# Patient Record
Sex: FEMALE | Race: WHITE | Employment: OTHER | URBAN - METROPOLITAN AREA
[De-identification: names, ages, dates, MRNs, and addresses within clinical notes are randomized per-mention and may not be internally consistent; named-entity substitution may affect disease eponyms.]

---

## 2017-03-20 ENCOUNTER — OFFICE VISIT (OUTPATIENT)
Dept: FAMILY MEDICINE CLINIC | Facility: CLINIC | Age: 52
End: 2017-03-20

## 2017-03-20 VITALS
RESPIRATION RATE: 18 BRPM | OXYGEN SATURATION: 98 % | BODY MASS INDEX: 33.21 KG/M2 | TEMPERATURE: 99 F | SYSTOLIC BLOOD PRESSURE: 128 MMHG | WEIGHT: 206.63 LBS | HEIGHT: 66 IN | HEART RATE: 78 BPM | DIASTOLIC BLOOD PRESSURE: 78 MMHG

## 2017-03-20 DIAGNOSIS — J20.9 BRONCHITIS WITH BRONCHOSPASM: ICD-10-CM

## 2017-03-20 DIAGNOSIS — J01.00 ACUTE NON-RECURRENT MAXILLARY SINUSITIS: Primary | ICD-10-CM

## 2017-03-20 PROCEDURE — 99213 OFFICE O/P EST LOW 20 MIN: CPT | Performed by: NURSE PRACTITIONER

## 2017-03-20 RX ORDER — FLUTICASONE PROPIONATE 50 MCG
2 SPRAY, SUSPENSION (ML) NASAL DAILY
Qty: 1 BOTTLE | Refills: 0 | Status: SHIPPED | OUTPATIENT
Start: 2017-03-20 | End: 2017-04-10

## 2017-03-20 RX ORDER — BENZONATATE 100 MG/1
200 CAPSULE ORAL 3 TIMES DAILY PRN
Qty: 15 CAPSULE | Refills: 0 | Status: SHIPPED | OUTPATIENT
Start: 2017-03-20 | End: 2017-03-25

## 2017-03-20 RX ORDER — DOXYCYCLINE HYCLATE 100 MG/1
100 CAPSULE ORAL 2 TIMES DAILY
Qty: 14 CAPSULE | Refills: 0 | Status: SHIPPED | OUTPATIENT
Start: 2017-03-20 | End: 2017-03-27

## 2017-03-20 NOTE — PROGRESS NOTES
CHIEF COMPLAINT:   Patient presents with:  URI: This is a 45 yo female with known mild intermittant asthma presentin with head nasal congestion sinus pressure post nasal drainage and excerbation of asthma sx      HPI:   Yehuda Krause is a 46year old Use: Yes           0.0 oz/week       0 Standard drinks or equivalent per week       Comment: socially        REVIEW OF SYSTEMS:   GENERAL: feels well otherwise,   decreased appetite Taking fluids well  SKIN: no rashes or abnormal skin lesions  HEENT: See H Refills for this Visit:    Continue asthma rescue inhaler; Advair;Singulair as prescribed  Doxycycline  100 mg twice a day x 10 day  Flonase 2 sprays each nostril at bedtime  If any sob worsening of cough or wheezing to go to [facility]      No prescriptions re

## 2017-03-20 NOTE — PATIENT INSTRUCTIONS
Bronchitis with Wheezing (Viral or Bacterial: Adult)    Bronchitis is an infection of the air passages. It often occurs during a cold and is usually caused by a virus. Symptoms include cough with mucus (phlegm) and low-grade fever.  This illness is contag · Over-the-counter cough, cold, and sore-throat medicines will not shorten the length of the illness, but they may be helpful to reduce symptoms.  (Note: Do not use decongestants if you have high blood pressure.)  · If you were given an inhaler, use it exac The sinuses are air-filled spaces within the bones of the face. They connect to the inside of the nose. Sinusitis is an inflammation of the tissue lining the sinus cavity. Sinus inflammation can occur during a cold.  It can also be due to allergies to polle · Do not use nasal rinses or irrigation during an acute sinus infection, unless told to by your health care provider. Rinsing may spread the infection to other sinuses.   · Use acetaminophen or ibuprofen to control pain, unless another pain medicine was pre

## 2019-04-15 ENCOUNTER — WALK IN (OUTPATIENT)
Dept: URGENT CARE | Age: 54
End: 2019-04-15

## 2019-04-15 VITALS
RESPIRATION RATE: 16 BRPM | TEMPERATURE: 98.9 F | OXYGEN SATURATION: 97 % | DIASTOLIC BLOOD PRESSURE: 80 MMHG | SYSTOLIC BLOOD PRESSURE: 140 MMHG | HEART RATE: 98 BPM | HEIGHT: 66 IN

## 2019-04-15 DIAGNOSIS — J98.01 COUGH DUE TO BRONCHOSPASM: ICD-10-CM

## 2019-04-15 DIAGNOSIS — J02.9 SORE THROAT: ICD-10-CM

## 2019-04-15 DIAGNOSIS — I10 HYPERTENSION, UNSPECIFIED TYPE: ICD-10-CM

## 2019-04-15 DIAGNOSIS — H66.91 RIGHT ACUTE OTITIS MEDIA: Primary | ICD-10-CM

## 2019-04-15 DIAGNOSIS — R09.81 SINUS CONGESTION: ICD-10-CM

## 2019-04-15 LAB
INTERNAL PROCEDURAL CONTROLS ACCEPTABLE: YES
S PYO AG THROAT QL IA.RAPID: NEGATIVE

## 2019-04-15 PROCEDURE — 87880 STREP A ASSAY W/OPTIC: CPT | Performed by: NURSE PRACTITIONER

## 2019-04-15 PROCEDURE — 3079F DIAST BP 80-89 MM HG: CPT | Performed by: NURSE PRACTITIONER

## 2019-04-15 PROCEDURE — 99204 OFFICE O/P NEW MOD 45 MIN: CPT | Performed by: NURSE PRACTITIONER

## 2019-04-15 PROCEDURE — 3077F SYST BP >= 140 MM HG: CPT | Performed by: NURSE PRACTITIONER

## 2019-04-15 RX ORDER — FLUTICASONE PROPIONATE AND SALMETEROL 100; 50 UG/1; UG/1
1 POWDER RESPIRATORY (INHALATION)
COMMUNITY

## 2019-04-15 RX ORDER — MONTELUKAST SODIUM 10 MG/1
10 TABLET ORAL NIGHTLY
COMMUNITY

## 2019-04-15 RX ORDER — BENZONATATE 100 MG/1
100 CAPSULE ORAL 3 TIMES DAILY PRN
Qty: 20 CAPSULE | Refills: 0 | Status: SHIPPED | OUTPATIENT
Start: 2019-04-15 | End: 2019-04-22

## 2019-04-15 RX ORDER — AMOXICILLIN 875 MG/1
875 TABLET, COATED ORAL 2 TIMES DAILY
Qty: 20 TABLET | Refills: 0 | Status: SHIPPED | OUTPATIENT
Start: 2019-04-15 | End: 2019-04-25

## 2019-04-15 RX ORDER — CETIRIZINE HYDROCHLORIDE 10 MG/1
10 TABLET ORAL DAILY
COMMUNITY

## 2019-04-15 ASSESSMENT — ENCOUNTER SYMPTOMS
RHINORRHEA: 0
CHEST TIGHTNESS: 0
SLEEP DISTURBANCE: 1
FATIGUE: 0
SORE THROAT: 1
ACTIVITY CHANGE: 0
NAUSEA: 0
WHEEZING: 0
FACIAL SWELLING: 0
CHILLS: 0
SINUS PRESSURE: 1
FEVER: 0
LIGHT-HEADEDNESS: 0
COUGH: 1
HEADACHES: 1
EYE REDNESS: 0
SHORTNESS OF BREATH: 0
ABDOMINAL PAIN: 0
SINUS PAIN: 0
DIZZINESS: 0

## 2023-07-13 RX ORDER — ASCORBIC ACID 500 MG
500 TABLET ORAL 2 TIMES DAILY
COMMUNITY

## 2023-07-13 RX ORDER — MONTELUKAST SODIUM 10 MG/1
10 TABLET ORAL NIGHTLY
COMMUNITY

## 2023-07-13 RX ORDER — HYDROXYCHLOROQUINE SULFATE 200 MG/1
200 TABLET, FILM COATED ORAL DAILY
COMMUNITY

## 2023-07-13 RX ORDER — AZATHIOPRINE 50 MG/1
150 TABLET ORAL DAILY
COMMUNITY

## 2023-07-13 RX ORDER — PHENTERMINE AND TOPIRAMATE 7.5; 46 MG/1; MG/1
CAPSULE, EXTENDED RELEASE ORAL
COMMUNITY
Start: 2023-06-22

## 2023-07-21 ENCOUNTER — HOSPITAL ENCOUNTER (OUTPATIENT)
Facility: HOSPITAL | Age: 58
Discharge: HOME | End: 2023-07-22
Attending: SPECIALIST | Admitting: SPECIALIST

## 2023-07-21 ENCOUNTER — ANESTHESIA (OUTPATIENT)
Dept: SURGERY | Facility: HOSPITAL | Age: 58
End: 2023-07-21

## 2023-07-21 ENCOUNTER — ANESTHESIA EVENT (OUTPATIENT)
Dept: SURGERY | Facility: HOSPITAL | Age: 58
End: 2023-07-21

## 2023-07-21 PROBLEM — M62.08 RECTUS DIASTASIS: Status: ACTIVE | Noted: 2023-07-21

## 2023-07-21 PROCEDURE — 0J0L3ZZ ALTERATION OF RIGHT UPPER LEG SUBCUTANEOUS TISSUE AND FASCIA, PERCUTANEOUS APPROACH: ICD-10-PCS | Performed by: SPECIALIST

## 2023-07-21 PROCEDURE — 0J083ZZ ALTERATION OF ABDOMEN SUBCUTANEOUS TISSUE AND FASCIA, PERCUTANEOUS APPROACH: ICD-10-PCS | Performed by: SPECIALIST

## 2023-07-21 PROCEDURE — 0JB80ZZ EXCISION OF ABDOMEN SUBCUTANEOUS TISSUE AND FASCIA, OPEN APPROACH: ICD-10-PCS | Performed by: SPECIALIST

## 2023-07-21 PROCEDURE — 0J0M3ZZ ALTERATION OF LEFT UPPER LEG SUBCUTANEOUS TISSUE AND FASCIA, PERCUTANEOUS APPROACH: ICD-10-PCS | Performed by: SPECIALIST

## 2023-07-21 RX ORDER — DEXAMETHASONE SODIUM PHOSPHATE 4 MG/ML
VIAL (ML) INJECTION AS NEEDED
Status: DISCONTINUED | OUTPATIENT
Start: 2023-07-21 | End: 2023-07-21 | Stop reason: SURG

## 2023-07-21 RX ORDER — MORPHINE SULFATE 2 MG/ML
2 INJECTION, SOLUTION INTRAMUSCULAR; INTRAVENOUS EVERY 2 HOUR PRN
Status: DISCONTINUED | OUTPATIENT
Start: 2023-07-21 | End: 2023-07-22

## 2023-07-21 RX ORDER — ACETAMINOPHEN 500 MG
1000 TABLET ORAL ONCE
Status: COMPLETED | OUTPATIENT
Start: 2023-07-21 | End: 2023-07-21

## 2023-07-21 RX ORDER — DEXTROSE, SODIUM CHLORIDE, SODIUM LACTATE, POTASSIUM CHLORIDE, AND CALCIUM CHLORIDE 5; .6; .31; .03; .02 G/100ML; G/100ML; G/100ML; G/100ML; G/100ML
INJECTION, SOLUTION INTRAVENOUS CONTINUOUS
Status: DISCONTINUED | OUTPATIENT
Start: 2023-07-21 | End: 2023-07-22

## 2023-07-21 RX ORDER — MORPHINE SULFATE 4 MG/ML
2 INJECTION, SOLUTION INTRAMUSCULAR; INTRAVENOUS EVERY 10 MIN PRN
Status: DISCONTINUED | OUTPATIENT
Start: 2023-07-21 | End: 2023-07-21 | Stop reason: HOSPADM

## 2023-07-21 RX ORDER — NALOXONE HYDROCHLORIDE 0.4 MG/ML
80 INJECTION, SOLUTION INTRAMUSCULAR; INTRAVENOUS; SUBCUTANEOUS AS NEEDED
Status: DISCONTINUED | OUTPATIENT
Start: 2023-07-21 | End: 2023-07-21 | Stop reason: HOSPADM

## 2023-07-21 RX ORDER — SODIUM CHLORIDE, SODIUM LACTATE, POTASSIUM CHLORIDE, CALCIUM CHLORIDE 600; 310; 30; 20 MG/100ML; MG/100ML; MG/100ML; MG/100ML
INJECTION, SOLUTION INTRAVENOUS CONTINUOUS
Status: DISCONTINUED | OUTPATIENT
Start: 2023-07-21 | End: 2023-07-21 | Stop reason: ALTCHOICE

## 2023-07-21 RX ORDER — CEFAZOLIN SODIUM/WATER 2 G/20 ML
2 SYRINGE (ML) INTRAVENOUS EVERY 8 HOURS
Status: COMPLETED | OUTPATIENT
Start: 2023-07-21 | End: 2023-07-22

## 2023-07-21 RX ORDER — CETIRIZINE HYDROCHLORIDE 5 MG/1
5 TABLET ORAL EVERY EVENING
Status: DISCONTINUED | OUTPATIENT
Start: 2023-07-21 | End: 2023-07-22

## 2023-07-21 RX ORDER — HYDROMORPHONE HYDROCHLORIDE 1 MG/ML
0.4 INJECTION, SOLUTION INTRAMUSCULAR; INTRAVENOUS; SUBCUTANEOUS EVERY 5 MIN PRN
Status: DISCONTINUED | OUTPATIENT
Start: 2023-07-21 | End: 2023-07-21 | Stop reason: HOSPADM

## 2023-07-21 RX ORDER — HYDROCODONE BITARTRATE AND ACETAMINOPHEN 5; 325 MG/1; MG/1
1 TABLET ORAL EVERY 4 HOURS PRN
Status: DISCONTINUED | OUTPATIENT
Start: 2023-07-21 | End: 2023-07-22

## 2023-07-21 RX ORDER — MELATONIN
3 NIGHTLY PRN
Status: DISCONTINUED | OUTPATIENT
Start: 2023-07-21 | End: 2023-07-22

## 2023-07-21 RX ORDER — BUPIVACAINE HYDROCHLORIDE 2.5 MG/ML
INJECTION, SOLUTION EPIDURAL; INFILTRATION; INTRACAUDAL AS NEEDED
Status: DISCONTINUED | OUTPATIENT
Start: 2023-07-21 | End: 2023-07-21 | Stop reason: HOSPADM

## 2023-07-21 RX ORDER — MONTELUKAST SODIUM 10 MG/1
10 TABLET ORAL NIGHTLY
Status: DISCONTINUED | OUTPATIENT
Start: 2023-07-21 | End: 2023-07-22

## 2023-07-21 RX ORDER — MORPHINE SULFATE 2 MG/ML
1 INJECTION, SOLUTION INTRAMUSCULAR; INTRAVENOUS EVERY 2 HOUR PRN
Status: DISCONTINUED | OUTPATIENT
Start: 2023-07-21 | End: 2023-07-22

## 2023-07-21 RX ORDER — ONDANSETRON 2 MG/ML
4 INJECTION INTRAMUSCULAR; INTRAVENOUS EVERY 6 HOURS PRN
Status: DISCONTINUED | OUTPATIENT
Start: 2023-07-21 | End: 2023-07-22

## 2023-07-21 RX ORDER — EPHEDRINE SULFATE 50 MG/ML
INJECTION, SOLUTION INTRAVENOUS AS NEEDED
Status: DISCONTINUED | OUTPATIENT
Start: 2023-07-21 | End: 2023-07-21 | Stop reason: SURG

## 2023-07-21 RX ORDER — HYDROXYCHLOROQUINE SULFATE 200 MG/1
200 TABLET, FILM COATED ORAL DAILY
Status: DISCONTINUED | OUTPATIENT
Start: 2023-07-21 | End: 2023-07-22

## 2023-07-21 RX ORDER — LOSARTAN POTASSIUM 25 MG/1
25 TABLET ORAL DAILY
Status: DISCONTINUED | OUTPATIENT
Start: 2023-07-22 | End: 2023-07-22

## 2023-07-21 RX ORDER — ACETAMINOPHEN 325 MG/1
650 TABLET ORAL EVERY 4 HOURS PRN
Status: DISCONTINUED | OUTPATIENT
Start: 2023-07-21 | End: 2023-07-22

## 2023-07-21 RX ORDER — PANTOPRAZOLE SODIUM 20 MG/1
20 TABLET, DELAYED RELEASE ORAL
Status: DISCONTINUED | OUTPATIENT
Start: 2023-07-22 | End: 2023-07-22

## 2023-07-21 RX ORDER — PROCHLORPERAZINE EDISYLATE 5 MG/ML
5 INJECTION INTRAMUSCULAR; INTRAVENOUS EVERY 8 HOURS PRN
Status: DISCONTINUED | OUTPATIENT
Start: 2023-07-21 | End: 2023-07-22

## 2023-07-21 RX ORDER — SODIUM CHLORIDE, SODIUM LACTATE, POTASSIUM CHLORIDE, CALCIUM CHLORIDE 600; 310; 30; 20 MG/100ML; MG/100ML; MG/100ML; MG/100ML
INJECTION, SOLUTION INTRAVENOUS CONTINUOUS
Status: DISCONTINUED | OUTPATIENT
Start: 2023-07-21 | End: 2023-07-21 | Stop reason: HOSPADM

## 2023-07-21 RX ORDER — MORPHINE SULFATE 4 MG/ML
4 INJECTION, SOLUTION INTRAMUSCULAR; INTRAVENOUS EVERY 2 HOUR PRN
Status: DISCONTINUED | OUTPATIENT
Start: 2023-07-21 | End: 2023-07-22

## 2023-07-21 RX ORDER — CEFAZOLIN SODIUM/WATER 2 G/20 ML
2 SYRINGE (ML) INTRAVENOUS ONCE
Status: COMPLETED | OUTPATIENT
Start: 2023-07-21 | End: 2023-07-21

## 2023-07-21 RX ORDER — HYDROMORPHONE HYDROCHLORIDE 1 MG/ML
0.6 INJECTION, SOLUTION INTRAMUSCULAR; INTRAVENOUS; SUBCUTANEOUS EVERY 5 MIN PRN
Status: DISCONTINUED | OUTPATIENT
Start: 2023-07-21 | End: 2023-07-21 | Stop reason: HOSPADM

## 2023-07-21 RX ORDER — SCOLOPAMINE TRANSDERMAL SYSTEM 1 MG/1
1 PATCH, EXTENDED RELEASE TRANSDERMAL ONCE
Status: DISCONTINUED | OUTPATIENT
Start: 2023-07-21 | End: 2023-07-22

## 2023-07-21 RX ORDER — ONDANSETRON 2 MG/ML
INJECTION INTRAMUSCULAR; INTRAVENOUS AS NEEDED
Status: DISCONTINUED | OUTPATIENT
Start: 2023-07-21 | End: 2023-07-21 | Stop reason: SURG

## 2023-07-21 RX ORDER — ROCURONIUM BROMIDE 10 MG/ML
INJECTION, SOLUTION INTRAVENOUS AS NEEDED
Status: DISCONTINUED | OUTPATIENT
Start: 2023-07-21 | End: 2023-07-21 | Stop reason: SURG

## 2023-07-21 RX ORDER — HYDROCODONE BITARTRATE AND ACETAMINOPHEN 5; 325 MG/1; MG/1
2 TABLET ORAL EVERY 4 HOURS PRN
Status: DISCONTINUED | OUTPATIENT
Start: 2023-07-21 | End: 2023-07-22

## 2023-07-21 RX ORDER — HYDROMORPHONE HYDROCHLORIDE 1 MG/ML
0.2 INJECTION, SOLUTION INTRAMUSCULAR; INTRAVENOUS; SUBCUTANEOUS EVERY 5 MIN PRN
Status: DISCONTINUED | OUTPATIENT
Start: 2023-07-21 | End: 2023-07-21 | Stop reason: HOSPADM

## 2023-07-21 RX ORDER — MORPHINE SULFATE 10 MG/ML
6 INJECTION, SOLUTION INTRAMUSCULAR; INTRAVENOUS EVERY 10 MIN PRN
Status: DISCONTINUED | OUTPATIENT
Start: 2023-07-21 | End: 2023-07-21 | Stop reason: HOSPADM

## 2023-07-21 RX ORDER — MIDAZOLAM HYDROCHLORIDE 1 MG/ML
INJECTION INTRAMUSCULAR; INTRAVENOUS AS NEEDED
Status: DISCONTINUED | OUTPATIENT
Start: 2023-07-21 | End: 2023-07-21 | Stop reason: SURG

## 2023-07-21 RX ORDER — DOXEPIN HYDROCHLORIDE 50 MG/1
1 CAPSULE ORAL DAILY
Status: DISCONTINUED | OUTPATIENT
Start: 2023-07-21 | End: 2023-07-22

## 2023-07-21 RX ORDER — LIDOCAINE HYDROCHLORIDE 10 MG/ML
INJECTION, SOLUTION EPIDURAL; INFILTRATION; INTRACAUDAL; PERINEURAL AS NEEDED
Status: DISCONTINUED | OUTPATIENT
Start: 2023-07-21 | End: 2023-07-21 | Stop reason: SURG

## 2023-07-21 RX ORDER — MORPHINE SULFATE 4 MG/ML
4 INJECTION, SOLUTION INTRAMUSCULAR; INTRAVENOUS EVERY 10 MIN PRN
Status: DISCONTINUED | OUTPATIENT
Start: 2023-07-21 | End: 2023-07-21 | Stop reason: HOSPADM

## 2023-07-21 RX ADMIN — MIDAZOLAM HYDROCHLORIDE 2 MG: 1 INJECTION INTRAMUSCULAR; INTRAVENOUS at 07:40:00

## 2023-07-21 RX ADMIN — ROCURONIUM BROMIDE 10 MG: 10 INJECTION, SOLUTION INTRAVENOUS at 09:25:00

## 2023-07-21 RX ADMIN — LIDOCAINE HYDROCHLORIDE 25 MG: 10 INJECTION, SOLUTION EPIDURAL; INFILTRATION; INTRACAUDAL; PERINEURAL at 07:42:00

## 2023-07-21 RX ADMIN — ONDANSETRON 4 MG: 2 INJECTION INTRAMUSCULAR; INTRAVENOUS at 09:52:00

## 2023-07-21 RX ADMIN — DEXAMETHASONE SODIUM PHOSPHATE 4 MG: 4 MG/ML VIAL (ML) INJECTION at 09:52:00

## 2023-07-21 RX ADMIN — CEFAZOLIN SODIUM/WATER 2 G: 2 G/20 ML SYRINGE (ML) INTRAVENOUS at 07:35:00

## 2023-07-21 RX ADMIN — ROCURONIUM BROMIDE 20 MG: 10 INJECTION, SOLUTION INTRAVENOUS at 08:30:00

## 2023-07-21 RX ADMIN — ROCURONIUM BROMIDE 30 MG: 10 INJECTION, SOLUTION INTRAVENOUS at 07:42:00

## 2023-07-21 RX ADMIN — EPHEDRINE SULFATE 5 MG: 50 INJECTION, SOLUTION INTRAVENOUS at 09:17:00

## 2023-07-21 RX ADMIN — CEFAZOLIN SODIUM/WATER: 2 G/20 ML SYRINGE (ML) INTRAVENOUS at 10:23:00

## 2023-07-21 RX ADMIN — SODIUM CHLORIDE, SODIUM LACTATE, POTASSIUM CHLORIDE, CALCIUM CHLORIDE: 600; 310; 30; 20 INJECTION, SOLUTION INTRAVENOUS at 07:30:00

## 2023-07-21 RX ADMIN — SODIUM CHLORIDE, SODIUM LACTATE, POTASSIUM CHLORIDE, CALCIUM CHLORIDE: 600; 310; 30; 20 INJECTION, SOLUTION INTRAVENOUS at 10:23:00

## 2023-07-21 NOTE — ANESTHESIA PROCEDURE NOTES
Airway  Date/Time: 7/21/2023 7:48 AM  Urgency: elective    Airway not difficult    General Information and Staff    Patient location during procedure: OR  Anesthesiologist: Reynold Cruz MD  Performed: anesthesiologist   Performed by: Reynold Cruz MD  Authorized by: Reynold Cruz MD      Indications and Patient Condition  Indications for airway management: airway protection and anesthesia  Spontaneous Ventilation: absent  Sedation level: deep  Preoxygenated: yes  Patient position: sniffing  MILS not maintained throughout  Mask difficulty assessment: 2 - vent by mask + OA or adjuvant +/- NMBA  No planned trial extubation    Final Airway Details  Final airway type: endotracheal airway      Successful airway: ETT  Cuffed: yes   Successful intubation technique: Video laryngoscopy  Facilitating devices/methods: intubating stylet  Endotracheal tube insertion site: oral  Blade: GlideScope  Blade size: #3  ETT size (mm): 7.0    Cormack-Lehane Classification: grade I - full view of glottis  Placement verified by: capnometry and single lung ventilation   Measured from: teeth  ETT to teeth (cm): 19  Number of attempts at approach: 1  Ventilation between attempts: none  Number of other approaches attempted: 0    Additional Comments  Grayson scope    Tube inflated to minimal sealing pressure

## 2023-07-21 NOTE — H&P
History & Physical Examination    Patient Name: Gloria Ortez  MRN: [id number]  CSN: [id number]  YOB: 1965    Diagnosis: excess skin/fat abdomen, hips and mons pubis with rectus diastasis    Present Illness: 60 yo presents with complaints of excess skin/fat abdomen hips and mons pubis with rectus diastasis. She requests full abdominoplasty with liposuction of hips and mons pubis. All risks and benefits and alternatives to surgery discussed and questions answered in detail and patient consents to proceed. montelukast 10 MG Oral Tab, Take 1 tablet (10 mg total) by mouth nightly., Disp: , Rfl: , 7/20/2023 at 2200  hydroxychloroquine 200 MG Oral Tab, Take 1 tablet (200 mg total) by mouth daily. Take 2 pills m-th and 1 pill Friday - Sun, Disp: , Rfl: , 7/20/2023 at 1800  azaTHIOprine 50 MG Oral Tab, Take 3 tablets (150 mg total) by mouth daily. , Disp: , Rfl: , Past Week  QSYMIA 7.5-46 MG Oral Capsule SR 24 Hr, , Disp: , Rfl: , 7/5/2023  Vitamin C 500 MG Oral Tab, Take 1 tablet (500 mg total) by mouth in the morning and 1 tablet (500 mg total) before bedtime. , Disp: , Rfl: , 7/20/2023 at 1800  Bimatoprost (LATISSE EX), Apply topically. , Disp: , Rfl: , 7/20/2023 at 2200  [facility] HFA 45 MCG/ACT Inhalation Aerosol, 1 puff as needed. , Disp: , Rfl: 10, 7/20/2023 at 2200  omeprazole 20 MG Oral Capsule Delayed Release, , Disp: , Rfl: 11, 7/20/2023 at 0800  Multiple Vitamin (MULTI-VITAMIN) Oral Tab, Take 1 tablet by mouth daily. , Disp: , Rfl: , 7/20/2023 at 1800  ADVAIR HFA IN, Inhale 1 puff into the lungs in the morning and 1 puff before bedtime. , Disp: , Rfl: , 7/21/2023 at 0500  ZYRTEC ALLERGY OR, Take by mouth every evening., Disp: , Rfl: , 7/20/2023 at 2200  Losartan Potassium (COZAAR) 25 MG Oral Tab, Take 1 tablet (25 mg total) by mouth daily. , Disp: , Rfl: , 7/20/2023 at 0800      lactated ringers infusion, , Intravenous, Continuous  ceFAZolin (Ancef) 2 g in 20mL IV syringe premix, 2 g, Intravenous, Once  scopolamine (Transderm-Scop) 1 MG/3DAYS patch 1 patch, 1 patch, Transdermal, Once        Allergies:   Pollen Extract-Tree*    SHORTNESS OF BREATH  Shellfish-Derived P*    HIVES    Past Medical History:   Diagnosis Date    Allergic rhinitis     Anesthesia complication     Asthma     Esophageal reflux     Extrinsic asthma, unspecified     High blood pressure     IBS     Improved    PONV (postoperative nausea and vomiting)     Unspecified essential hypertension     Visual impairment     Glasses/Contacts     Past Surgical History:   Procedure Laterality Date    HYSTERECTOMY  4/9/12    ROBOTIC HYST    OTHER SURGICAL HISTORY  1998    rhinoplasty     Family History   Problem Relation Age of Onset    Cancer Father         pancreatic    Other (Parkinson's Disease) Mother     Dementia Mother     Breast Cancer Maternal Grandmother         in her 62s    No Known Problems Sister      Social History    Tobacco Use      Smoking status: Never      Smokeless tobacco: Never    Alcohol use: Yes      Alcohol/week: 0.0 standard drinks of alcohol      Comment: socially      SYSTEM Check if Review is Normal Check if Physical Exam is Abnormal If not normal, please explain:   HEENT [ x] [ ]    Kendy Glassing [ x] [ ]    HEART [x ] [ ]    LUNGS [ x] [ ]    ABDOMEN [ ] x Excess skin/fat abdomen hips and mons pubis   UROGENITAL [ x] [ ]    EXTREMITIES [ x] [ ]    Sravani Dale [ x] [ ]    OTHER        [ x ] I have discussed the risks and benefits and alternatives with the patient/family. [ x ] The above referenced H&P was reviewed by Daysi Saha MD on 7/21/2023 the patient was examined and no significant changes have occurred in the patient's condition since the H&P was performed. I discussed with the patient and/or legal representative the potential benefits, risks and side effects of this procedure; the likelihood of the patient achieving goals; and potential problems that might occur during recuperation.   I discussed reasonable alternatives to the procedure, including risks, benefits and side effects related to the alternatives and risks related to not receiving this procedure. We will proceed with procedure as planned. All questions have been answered in detail and they understand and agree to proceed with plan of care. [ x ] I have reviewed the History and Physical done within the last 30 days. Any changes noted above.     Mary Cordoba MD  7/21/2023  7:29 AM

## 2023-07-21 NOTE — OPERATIVE REPORT
[facility]    PATIENT'S NAME: Rocael Ray   ATTENDING PHYSICIAN: Siomara Torres MD   OPERATING PHYSICIAN: Siomara Torres MD   PATIENT ACCOUNT#:   [id number]    LOCATION:   Room 1 A [facility]  MEDICAL RECORD #:   [id number]       YOB: 1965  ADMISSION DATE:       07/21/2023      OPERATION DATE:  07/21/2023    OPERATIVE REPORT      PREOPERATIVE DIAGNOSIS:  Excess skin and fatty tissue of the anterior abdominal wall, the hips, and the mons pubis with rectus diastasis. POSTOPERATIVE DIAGNOSIS:  Excess skin and fatty tissue of the anterior abdominal wall, the hips, and the mons pubis with rectus diastasis. PROCEDURE:  Full abdominoplasty with liposuction of the hips and the mons pubis. ASSISTANT:  Flori Mancera CSA     ANESTHESIA:  General endotracheal anesthesia. ESTIMATED BLOOD LOSS:  100 mL. INTRAVENOUS FLUIDS:  2000 mL of crystalloid. COMPLICATIONS:  None. DRAINS PLACED:  Two 7 mm flat JOSE drains in the lower abdominal area and 2 On-Q pain pump catheters in the lower abdominal area. DISPOSITION:  Patient tolerated the procedure well, was awakened from anesthesia, and transferred to the recovery room in good condition. TISSUE REMOVED:  1884 g of skin and fatty tissue from the lower abdominal area. INDICATIONS:  This is a 29-year-old patient who presented to my office with complaints of excess skin and fatty tissue of the anterior abdominal wall, the hips, and the mons pubis. She requested a full abdominoplasty with liposuction of the hips and the mons pubis. We discussed the operation in detail at 2 separate preoperative visits.   She was made aware of the risks of bleeding, infection, scarring, asymmetry, contour irregularities, wound dehiscence with the need for secondary wound healing, skin flap necrosis with the need for secondary wound healing, umbilical necrosis with the need for secondary wound healing; skin flap sensory loss that can be permanent in nature; umbilical sensory loss that can be permanent in nature; overall dissatisfaction with the final aesthetic outcome; the need for revisional surgery; the use of JOSE drains and pain pumps with the operation; hematoma; seroma; and the rare but possible risk of DVT, PE, MI, COVID-19 infection, sepsis, and death. Understanding these risks and limitations, she wished to proceed. I answered all her questions in detail regarding the procedure at 2 separate preoperative visits and again in the preoperative holding area. Once all questions were answered and consents obtained, the procedure went as follows. OPERATIVE TECHNIQUE:  The patient was taken to the operating room, placed on table in a supine position. General endotracheal anesthesia was induced without any complications. The anterior abdominal area, hips and mons pubis area were prepped and draped in normal sterile fashion. At the beginning of the procedure, approximately 600 mL of tumescent solution was infiltrated throughout the lower abdominal area, the hips and the mons pubis. At this point, a 3.0 mm liposuction cannula was used to lipoaspirate a total of 300 mL of fatty tissue from the hips and an additional 50 mL from the mons pubis area. The sides were examined for symmetry and found to have excellent shape and symmetry. At this point, the premarked lower abdominal incision was made with a 10 blade. Dissection was carried down through the skin and subcutaneous tissue until the underlying rectus fascia was identified. Dissection was carried on the anterior surface of the rectus fascia to the level of the umbilicus. The umbilicus was then incised, and the stump of the umbilicus was left adherent to the anterior abdominal wall with a healthy stalk of vascularized tissue. Dissection was carried further superiorly, staying over the rectus muscles to preserve blood supply to the abdominal overlying skin flap.   Dissection was carried to the xiphoid process. The area was then irrigated with warm antibiotic irrigation as well as with Betadine. Bipolar was used for hemostasis throughout. The area was once again irrigated with antibiotic irrigation, examined for bleeding, and bipolar was again used for hemostasis throughout. A marking pen was used to wyatt the rectus diastasis in the supraumbilical as well as infraumbilical area, and the rectus diastasis was imbricated using 3 layers of looped 0 nylon suture, first in the supraumbilical and then the infraumbilical area. Once the abdominal wall was tautly reconstructed, it was irrigated again with antibiotic irrigation. Bipolar was again used for hemostasis throughout. Stab incisions were made in the groin for placement of two 7 mm flat JOSE drains, which were secured with 3-0 nylon sutures. The On-Q pain pump catheters were placed through stab incisions along the lower abdominal skin flap into the abdominal cavity. The tips of the catheters were secured loosely with a interrupted stitch of 2-0 Vicryl to ensure adequate dispersion of the Marcaine solution postoperatively. At this point, the bed was flexed to approximately 30 degrees. The abdominal skin flaps were brought down over the reconstructed abdominal wall. The distal aspect was marked, incised sharply with a 10 blade, and removed. The weight of the tissue removed was 1884 g. Bipolar was used for hemostasis along the fresh incisional edge, and then closure of the incision line was done with 2-0 Vicryl sutures in Fannie fascia, 2-0 Vicryl suture in the deeper dermis, and a running intradermal 4-0 plain was used to reapproximate the superficial dermis. At the level of the umbilicus, a vertically oriented elliptical incision was made. The skin and underlying subcutaneous tissue was removed sharply.   Bipolar was used for hemostasis along the fresh incisional edge, and then the original umbilicus, which was pink and viable with no evidence of venous congestion or ischemia, was brought out and sewn in place with 3-0 Vicryl and 4-0 Prolene. Mastisol and Steri-Strips were applied along the incision line. Mastisol, Steri-Strips, Biopatches, and Tegaderms were used to secure the JOSE drain and pain pumps. The patient was placed into an abdominal binder, awakened from anesthesia, and transferred to the recovery room in good condition. She will be admitted for an overnight 23-hour observation. Dictated By Florencio Torres MD  d: 07/21/2023 10:10:29  t: 07/21/2023 14:07:00  [facility] [id number]  [facility]/

## 2023-07-21 NOTE — BRIEF OP NOTE
Pre-Operative Diagnosis: Excess skin fat of abdomen, hips and mons pubis     Post-Operative Diagnosis: Excess skin fat of abdomen, hips and mons pubis      Procedure Performed:   Abdominoplasty with liposuction of hips and mons pubis with JOSE drain and pain pump placement    Surgeon(s) and Role:     Eliseo English MD - Primary    Assistant(s):  Surgical Assistant.: Conrado Armstrong     Surgical Findings: Nothing unusual     Specimen: None     Estimated Blood Loss: 100cc    Dictation Number:  Dictated    Iam Dugan MD  7/21/2023  10:14 AM

## 2023-07-21 NOTE — DISCHARGE INSTRUCTIONS
JOSE drain care as instructed - empty, record and strip JOSE drains as needed to keep a kink in the JOSE bulb. Please reinforce JOSE drain sites with 4x4 gauze as needed if there is drainage from under the clear plastic adhesive dressing. Do not remove any plastic adhesive dressings covering JOSE drain skin sites or pain pump sites. May remove garment briefly if needed, then replace promptly. Begin antibiotics tonight as instructed; Pain medication as needed for pain. Ambulate and deep breathe regularly. Hold on shower until  Jose drains removed. May sponge bath as needed. Return to clinic as scheduled.

## 2023-07-21 NOTE — DISCHARGE SUMMARY
[facility]    Discharge Summary    Shmuel Echavarria Patient Status:  Outpatient in a Bed    1965 MRN [id number]   Location [facility] Attending Miguel Valenzuela MD   Hosp Day # 0 PCP Antonio Steele     Date of Admission: 2023   Date of Discharge:2023    Admitting Diagnosis: Excess skin fat of abdomen, hips and mons pubis    Disposition: Discharge to home    Discharge Diagnosis: Excess skin fat of abdomen, hips and mons pubis    Hospital Course:   Reason for Admission: Excess skin fat of abdomen, hips and mons pubis      Surgical Procedures       Case IDs Date Procedure Surgeon Location Status    [id number] 23 Abdominoplasty with liposuction of hips and mons pubis with JOSE drain and pain pump placement MD Marcia Alvarado 124 Course: Uncomplicated    Complications: None        Discharge Plan:   Discharge Condition:Good    Current Discharge Medication List    Home Meds - Unchanged    montelukast 10 MG Oral Tab  Take 1 tablet (10 mg total) by mouth nightly. hydroxychloroquine 200 MG Oral Tab  Take 1 tablet (200 mg total) by mouth daily. Take 2 pills - and 1 pill Friday - Sun    azaTHIOprine 50 MG Oral Tab  Take 3 tablets (150 mg total) by mouth daily. QSYMIA 7.5-46 MG Oral Capsule SR 24 Hr      Vitamin C 500 MG Oral Tab  Take 1 tablet (500 mg total) by mouth in the morning and 1 tablet (500 mg total) before bedtime. Bimatoprost (LATISSE EX)  Apply topically. XOPENEX HFA 45 MCG/ACT Inhalation Aerosol  1 puff as needed. omeprazole 20 MG Oral Capsule Delayed Release      Multiple Vitamin (MULTI-VITAMIN) Oral Tab  Take 1 tablet by mouth daily. ADVAIR HFA IN  Inhale 1 puff into the lungs in the morning and 1 puff before bedtime. ZYRTEC ALLERGY OR  Take by mouth every evening. Losartan Potassium (COZAAR) 25 MG Oral Tab  Take 1 tablet (25 mg total) by mouth daily.                 Discharge Diet: Low sodium diet (1500mg/day)    Discharge Activity: As tolerated    Follow up: Follow-up Information       Gina Leyva MD Follow up in 2 day(s). Specialty: SURGERY, PLASTIC  Why: Pain pump removal  Contact information:  [place]  [phone]                                 Other Discharge Instructions:         MEJIA drain care as instructed - empty, record and strip MEJIA drains as needed to keep a kink in the MEJIA bulb. Please reinforce MEJIA drain sites with 4x4 gauze as needed if there is drainage from under the clear plastic adhesive dressing. Do not remove any plastic adhesive dressings covering MEJIA drain skin sites or pain pump sites. May remove garment briefly if needed, then replace promptly. Begin antibiotics tonight as instructed; Pain medication as needed for pain. Ambulate and deep breathe regularly. Hold on shower until  Mejia drains removed. May sponge bath as needed. Return to clinic as scheduled.          Shemar Vivar MD  7/21/2023

## 2023-07-22 VITALS
HEIGHT: 65 IN | OXYGEN SATURATION: 97 % | DIASTOLIC BLOOD PRESSURE: 62 MMHG | RESPIRATION RATE: 19 BRPM | TEMPERATURE: 98 F | BODY MASS INDEX: 25.99 KG/M2 | WEIGHT: 156 LBS | SYSTOLIC BLOOD PRESSURE: 113 MMHG | HEART RATE: 79 BPM

## 2023-07-22 PROBLEM — M62.08 RECTUS DIASTASIS: Status: RESOLVED | Noted: 2023-07-21 | Resolved: 2023-07-22

## (undated) DEVICE — SUT ETHILON 0 CT L886T

## (undated) DEVICE — ABDOMINAL BINDER: Brand: DEROYAL

## (undated) DEVICE — SUT ETHILON 3-0 FS-1 669H

## (undated) DEVICE — EVACUATOR URO RELIVAC 100CC

## (undated) DEVICE — CG INFILTRATION TUBING: Brand: CG INFILTRATION TUBING

## (undated) DEVICE — DRAIN INCS 7MM 20CMX7MM SIL

## (undated) DEVICE — CABLE BIPOLAR 12FT DISPOSABLE

## (undated) DEVICE — SYSTEM PAIN PMP 4ML/HR ONQ

## (undated) DEVICE — CLIPPER BLADE WIDE 3M

## (undated) DEVICE — PSI-TEC TUBING: Brand: PSI-TEC TUBING

## (undated) DEVICE — VIOLET BRAIDED (POLYGLACTIN 910), SYNTHETIC ABSORBABLE SUTURE: Brand: COATED VICRYL

## (undated) DEVICE — SOL NACL IRRIG 0.9% 1000ML BTL

## (undated) DEVICE — 3M™ TEGADERM™ TRANSPARENT FILM DRESSING, 1626W, 4 IN X 4-3/4 IN (10 CM X 12 CM), 50 EACH/CARTON, 4 CARTON/CASE: Brand: 3M™ TEGADERM™

## (undated) DEVICE — SYRINGE BULB 50/CS 48/PLT: Brand: MEDEGEN MEDICAL PRODUCTS, LLC

## (undated) DEVICE — 3M™ STERI-DRAPE™ INSTRUMENT POUCH 1018: Brand: STERI-DRAPE™

## (undated) DEVICE — DRESSING BIOPATCH 1X4 BLUE

## (undated) DEVICE — PROXIMATE SKIN STAPLERS (35 WIDE) CONTAINS 35 STAINLESS STEEL STAPLES (FIXED HEAD): Brand: PROXIMATE

## (undated) DEVICE — BANDAGE,GAUZE,BULKEE II,4.5"X4.1YD,STRL: Brand: MEDLINE

## (undated) DEVICE — TOWEL SURG OR 17X30IN BLUE

## (undated) DEVICE — SUT VICRYL 2-0 FS-1 J443H

## (undated) DEVICE — GAUZE TRAY STERILE 4X4 12PLY

## (undated) DEVICE — SKIN PREP TRAY 4 COMPARTM TRAY: Brand: MEDLINE INDUSTRIES, INC.

## (undated) DEVICE — SUT PROLENE 5-0 P-3 8698G

## (undated) DEVICE — SUT PROLENE 4-0 FS-2 8683G

## (undated) DEVICE — GAMMEX® PI HYBRID SIZE 6.5, STERILE POWDER-FREE SURGICAL GLOVE, POLYISOPRENE AND NEOPRENE BLEND: Brand: GAMMEX

## (undated) DEVICE — SUT VICRYL 5-0 P-3 J493G

## (undated) DEVICE — SUCTION CANISTER, 3000CC,SAFELINER: Brand: DEROYAL

## (undated) DEVICE — SPONGE PREMIERPRO 7X18X18

## (undated) DEVICE — ADHESIVE MASTISOL 2/3ML

## (undated) DEVICE — 3M™ BAIR HUGGER® UNDERBODY BLANKET, FULL ACCESS, 10 PER CASE 63500: Brand: BAIR HUGGER™

## (undated) DEVICE — MAJOR GENERAL: Brand: MEDLINE INDUSTRIES, INC.

## (undated) DEVICE — SUT VICRYL 3-0 PS-2 J497G

## (undated) DEVICE — 3M™ STERI-STRIP™ REINFORCED ADHESIVE SKIN CLOSURES, R1547, 1/2 IN X 4 IN (12 MM X 100 MM), 6 STRIPS/ENVELOPE: Brand: 3M™ STERI-STRIP™

## (undated) NOTE — MR AVS SNAPSHOT
[facility] [address]  [address] [place] [address] Road  [phone]               Thank you for choosing us for your health care visit with JEREMIAH Mireles.   We are glad to serve you and happy to provide you with this summary of your go back to your usual activities, don't let yourself get too tired. · Do not smoke. Also avoid being exposed to secondhand smoke. · You may use over-the-counter medicine to control fever or pain, unless another medicine was prescribed.  Note: If you have Call your healthcare provider right away if any of these occur:  · Fever of 100.4°F (38°C) or higher  · Coughing up increasing amounts of colored sputum  · Weakness, drowsiness, headache, facial pain, ear pain, or a stiff neck  Call 911, or get immediate m · Over-the-counter decongestants may be used unless a similar medicine was prescribed. Nasal sprays work the fastest. Use one that contains phenylephrine or oxymetazoline. First blow the nose gently. Then use the spray.  Do not use these medicines more ofte [address]77. All rights reserved. This information is not intended as a substitute for professional medical care. Always follow your healthcare professional's instructions.              Allergies as of Mar 20, 2017     Shellfish-Derived Products                 T - Doxycycline Hyclate 100 MG Caps  - Fluticasone Propionate 50 MCG/ACT Susp            MyChart     Visit MyChart  You can access your MyChart to more actively manage your health care and view more details from this visit by going to [email or web address]   Don’t forget strength training with weights and resistance Set goals and track your progress   You don’t need to join a gym. Home exercises work great.  Put more priority on exercise in your life                    Visit [facility]